# Patient Record
Sex: MALE | URBAN - METROPOLITAN AREA
[De-identification: names, ages, dates, MRNs, and addresses within clinical notes are randomized per-mention and may not be internally consistent; named-entity substitution may affect disease eponyms.]

---

## 2021-12-17 ENCOUNTER — OFFICE VISIT (OUTPATIENT)
Dept: URBAN - METROPOLITAN AREA CLINIC 33 | Facility: CLINIC | Age: 51
End: 2021-12-17
Payer: COMMERCIAL

## 2021-12-17 DIAGNOSIS — H52.223 REGULAR ASTIGMATISM, BILATERAL: ICD-10-CM

## 2021-12-17 DIAGNOSIS — H53.143 BILATERAL ASTHENOPIA: Primary | ICD-10-CM

## 2021-12-17 PROCEDURE — 99203 OFFICE O/P NEW LOW 30 MIN: CPT | Performed by: OPTOMETRIST

## 2021-12-17 ASSESSMENT — INTRAOCULAR PRESSURE
OD: 9
OS: 9

## 2021-12-17 ASSESSMENT — VISUAL ACUITY
OD: 20/20
OS: 20/20

## 2021-12-17 NOTE — IMPRESSION/PLAN
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 Plan: Discussed visual discomfort OU. Advised patient to update glasses to prevent fatigue. Screening questionnaire received and scanned to chart